# Patient Record
Sex: MALE | Race: WHITE | URBAN - METROPOLITAN AREA
[De-identification: names, ages, dates, MRNs, and addresses within clinical notes are randomized per-mention and may not be internally consistent; named-entity substitution may affect disease eponyms.]

---

## 2018-04-02 ENCOUNTER — OFFICE VISIT (OUTPATIENT)
Dept: OTOLARYNGOLOGY | Facility: CLINIC | Age: 23
End: 2018-04-02

## 2018-04-02 VITALS
SYSTOLIC BLOOD PRESSURE: 149 MMHG | BODY MASS INDEX: 28 KG/M2 | WEIGHT: 200 LBS | DIASTOLIC BLOOD PRESSURE: 93 MMHG | HEIGHT: 71 IN | TEMPERATURE: 99 F

## 2018-04-02 DIAGNOSIS — J35.8 TONSIL ASYMMETRY: Primary | ICD-10-CM

## 2018-04-02 PROCEDURE — 99203 OFFICE O/P NEW LOW 30 MIN: CPT | Performed by: OTOLARYNGOLOGY

## 2018-04-02 PROCEDURE — 99212 OFFICE O/P EST SF 10 MIN: CPT | Performed by: OTOLARYNGOLOGY

## 2018-04-02 RX ORDER — AZITHROMYCIN 250 MG/1
TABLET, FILM COATED ORAL
Refills: 0 | COMMUNITY
Start: 2018-03-22 | End: 2018-04-02 | Stop reason: ALTCHOICE

## 2018-04-02 RX ORDER — PREDNISONE 50 MG/1
TABLET ORAL
COMMUNITY
Start: 2018-03-31

## 2018-04-02 RX ORDER — CLARITHROMYCIN 250 MG/1
TABLET, FILM COATED ORAL
COMMUNITY
Start: 2018-03-26

## 2018-04-02 NOTE — PROGRESS NOTES
Narinder Whyte is a 20year old male. Patient presents with:   Tonsil Problem: enlarged right tonsil, pt is currently on antibiotics and steroids       HISTORY OF PRESENT ILLNESS  He presents with a history of acute tonsillar infection was started nikki heat intolerance. Neuro Negative Tremors. Psych Negative Anxiety and depression. Integumentary Negative Frequent skin infections, pigment change and rash. Hema/Lymph Negative Easy bleeding and easy bruising.            PHYSICAL EXAM    /93 (BP ASSESSMENT AND PLAN    1. Tonsil asymmetry  Slight asymmetry of the tonsils of the right being slightly larger than the left. Resolving peritonsillar cellulitis/phlegmon/abscess?   No peritonsillar process present with no tenderness swelling or erythema

## 2025-01-20 ENCOUNTER — APPOINTMENT (OUTPATIENT)
Dept: FAMILY MEDICINE | Age: 30
End: 2025-01-20